# Patient Record
Sex: MALE | Race: WHITE | NOT HISPANIC OR LATINO | Employment: OTHER | URBAN - NONMETROPOLITAN AREA
[De-identification: names, ages, dates, MRNs, and addresses within clinical notes are randomized per-mention and may not be internally consistent; named-entity substitution may affect disease eponyms.]

---

## 2021-09-27 LAB
CHOLEST SERPL-MSCNC: 263 MG/DL (ref 0–200)
GLUCOSE SERPL-MCNC: 83 MG/DL
HDLC SERPL-MCNC: 44 MG/DL
LDLC SERPL CALC-MCNC: 190 MG/DL
TRIGL SERPL-MCNC: 144 MG/DL

## 2021-11-01 LAB — CRC RECOMMENDATION EXT: NORMAL

## 2023-06-09 ENCOUNTER — OFFICE VISIT (OUTPATIENT)
Dept: FAMILY MEDICINE | Facility: CLINIC | Age: 58
End: 2023-06-09
Payer: MEDICARE

## 2023-06-09 VITALS
WEIGHT: 163.63 LBS | HEART RATE: 54 BPM | RESPIRATION RATE: 18 BRPM | TEMPERATURE: 98 F | DIASTOLIC BLOOD PRESSURE: 79 MMHG | BODY MASS INDEX: 25.68 KG/M2 | OXYGEN SATURATION: 99 % | HEIGHT: 67 IN | SYSTOLIC BLOOD PRESSURE: 129 MMHG

## 2023-06-09 DIAGNOSIS — M54.42 CHRONIC BILATERAL LOW BACK PAIN WITH BILATERAL SCIATICA: Primary | ICD-10-CM

## 2023-06-09 DIAGNOSIS — M1A.09X0 IDIOPATHIC CHRONIC GOUT OF MULTIPLE SITES WITHOUT TOPHUS: ICD-10-CM

## 2023-06-09 DIAGNOSIS — G57.93 NEUROPATHY INVOLVING BOTH LOWER EXTREMITIES: ICD-10-CM

## 2023-06-09 DIAGNOSIS — M54.41 CHRONIC BILATERAL LOW BACK PAIN WITH BILATERAL SCIATICA: Primary | ICD-10-CM

## 2023-06-09 DIAGNOSIS — G89.29 CHRONIC BILATERAL LOW BACK PAIN WITH BILATERAL SCIATICA: Primary | ICD-10-CM

## 2023-06-09 PROCEDURE — 3008F PR BODY MASS INDEX (BMI) DOCUMENTED: ICD-10-PCS | Mod: ,,, | Performed by: INTERNAL MEDICINE

## 2023-06-09 PROCEDURE — 3074F PR MOST RECENT SYSTOLIC BLOOD PRESSURE < 130 MM HG: ICD-10-PCS | Mod: ,,, | Performed by: INTERNAL MEDICINE

## 2023-06-09 PROCEDURE — 99202 PR OFFICE/OUTPT VISIT, NEW, LEVL II, 15-29 MIN: ICD-10-PCS | Mod: ,,, | Performed by: INTERNAL MEDICINE

## 2023-06-09 PROCEDURE — 3008F BODY MASS INDEX DOCD: CPT | Mod: ,,, | Performed by: INTERNAL MEDICINE

## 2023-06-09 PROCEDURE — 3078F PR MOST RECENT DIASTOLIC BLOOD PRESSURE < 80 MM HG: ICD-10-PCS | Mod: ,,, | Performed by: INTERNAL MEDICINE

## 2023-06-09 PROCEDURE — 1160F RVW MEDS BY RX/DR IN RCRD: CPT | Mod: ,,, | Performed by: INTERNAL MEDICINE

## 2023-06-09 PROCEDURE — 1159F MED LIST DOCD IN RCRD: CPT | Mod: ,,, | Performed by: INTERNAL MEDICINE

## 2023-06-09 PROCEDURE — 99202 OFFICE O/P NEW SF 15 MIN: CPT | Mod: ,,, | Performed by: INTERNAL MEDICINE

## 2023-06-09 PROCEDURE — 1159F PR MEDICATION LIST DOCUMENTED IN MEDICAL RECORD: ICD-10-PCS | Mod: ,,, | Performed by: INTERNAL MEDICINE

## 2023-06-09 PROCEDURE — 1160F PR REVIEW ALL MEDS BY PRESCRIBER/CLIN PHARMACIST DOCUMENTED: ICD-10-PCS | Mod: ,,, | Performed by: INTERNAL MEDICINE

## 2023-06-09 PROCEDURE — 3078F DIAST BP <80 MM HG: CPT | Mod: ,,, | Performed by: INTERNAL MEDICINE

## 2023-06-09 PROCEDURE — 3074F SYST BP LT 130 MM HG: CPT | Mod: ,,, | Performed by: INTERNAL MEDICINE

## 2023-06-09 RX ORDER — FEBUXOSTAT 40 MG/1
40 TABLET, FILM COATED ORAL DAILY
COMMUNITY
Start: 2022-12-29

## 2023-06-09 NOTE — PROGRESS NOTES
New Clinic Note    Patient Name:  Kei Elizondo is a 58 y.o. male     Chief Complaint:    Chief Complaint   Patient presents with    [facility]     New patient. Moved to town needed a new PCP.     Pain     Have bilateral foot pain. 8/10 foot pain when its hurting. Sometimes they feel like they on fire. Feel like a burning ache. Feet go to sleep often.Spend less time on feert due to back pain. Had gout for 20 years. Was seeing [facility] in [place].     Back Pain     Had back surgery 2/2021. Back still given him problems. [facility] Dr. Kay. Going back to see  On June 22. Sitting and standing bring on a lot of pain in back.     Hip Pain     Seeing Dr. Garcia [facility] in [place]. Want hip surgery but has not made an official decision. Was told it would have to be a hip replacement. Hip not bothering as much since not working.          Subjective  Pain  Associated symptoms include arthralgias and numbness. Pertinent negatives include no abdominal pain, chest pain, congestion, coughing, fatigue, fever, headaches, neck pain, rash or sore throat.   Back Pain  Associated symptoms include numbness. Pertinent negatives include no abdominal pain, chest pain, dysuria, fever or headaches.   Hip Pain   Associated symptoms include numbness.          Current Outpatient Medications:     febuxostat (ULORIC) 40 mg Tab, Take by mouth., Disp: , Rfl:    History reviewed. No pertinent past medical history.   Past Surgical History:   Procedure Laterality Date    BACK SURGERY  02/15/2021      History reviewed. No pertinent family history.   Social History     Tobacco Use    Smoking status: Never    Smokeless tobacco: Never   Substance Use Topics    Alcohol use: Never    Drug use: Never        Review of Systems   Constitutional:  Negative for fatigue and fever.   HENT:  Negative for nasal congestion and sore throat.    Respiratory:  Negative for cough, shortness of breath and wheezing.   "  Cardiovascular:  Negative for chest pain and palpitations.   Gastrointestinal:  Negative for abdominal pain and blood in stool.   Genitourinary:  Negative for dysuria.   Musculoskeletal:  Positive for arthralgias and back pain. Negative for neck pain.   Integumentary:  Negative for rash and mole/lesion.   Neurological:  Positive for numbness. Negative for dizziness, headaches and memory loss.   Psychiatric/Behavioral:  Negative for agitation. The patient is not nervous/anxious.       Objective:  /79 (BP Location: Left arm, Patient Position: Sitting, BP Method: Medium (Automatic))   Pulse (!) 54   Temp 98.3 °F (36.8 °C) (Oral)   Resp 18   Ht 5' 6.5" (1.689 m)   Wt 74.2 kg (163 lb 9.6 oz)   SpO2 99%   BMI 26.01 kg/m²      Physical Exam  Constitutional:       Appearance: Normal appearance.   HENT:      Head: Normocephalic and atraumatic.      Right Ear: External ear normal.      Left Ear: External ear normal.      Nose: Nose normal.   Eyes:      Extraocular Movements: Extraocular movements intact.      Conjunctiva/sclera: Conjunctivae normal.      Pupils: Pupils are equal, round, and reactive to light.   Cardiovascular:      Rate and Rhythm: Normal rate and regular rhythm.      Pulses: Normal pulses.      Heart sounds: Normal heart sounds. No murmur heard.    No friction rub. No gallop.   Pulmonary:      Effort: Pulmonary effort is normal.      Breath sounds: No wheezing, rhonchi or rales.   Abdominal:      General: Abdomen is flat.      Palpations: Abdomen is soft.   Musculoskeletal:      Cervical back: Normal range of motion and neck supple.      Right lower leg: No edema.      Left lower leg: No edema.   Skin:     General: Skin is warm and dry.      Findings: No rash.   Neurological:      General: No focal deficit present.      Mental Status: He is alert and oriented to person, place, and time.      Cranial Nerves: No cranial nerve deficit.   Psychiatric:         Mood and Affect: Mood normal.    "     Assessment and Plan    Chronic bilateral low back pain with bilateral sciatica    Idiopathic chronic gout of multiple sites without tophus    Neuropathy involving both lower extremities         Problem List Items Addressed This Visit          Neuro    Neuropathy involving both lower extremities (Chronic)       Orthopedic    Idiopathic chronic gout of multiple sites without tophus (Chronic)    Chronic bilateral low back pain with bilateral sciatica - Primary (Chronic)      1-Chronic back pain-get records, he is about to get Lyrica via mail pharmacy, if this doesn't help we may try Cymbalta, he has failed neurontin -it caused increase bp  2-gout-get rheumatolgoy records  3-BLE neuropathy-probably from #1-get records and review to make sure b12, glucose etc has been checked  Get PCP previous records and Max C-scope report  Follow up in about 1 year (around 6/9/2024).

## 2023-12-08 ENCOUNTER — OFFICE VISIT (OUTPATIENT)
Dept: FAMILY MEDICINE | Facility: CLINIC | Age: 58
End: 2023-12-08
Payer: MEDICARE

## 2023-12-08 VITALS
WEIGHT: 168.19 LBS | HEIGHT: 67 IN | TEMPERATURE: 98 F | HEART RATE: 56 BPM | BODY MASS INDEX: 26.4 KG/M2 | OXYGEN SATURATION: 98 % | SYSTOLIC BLOOD PRESSURE: 119 MMHG | DIASTOLIC BLOOD PRESSURE: 81 MMHG | RESPIRATION RATE: 16 BRPM

## 2023-12-08 DIAGNOSIS — M1A.09X0 IDIOPATHIC CHRONIC GOUT OF MULTIPLE SITES WITHOUT TOPHUS: Chronic | ICD-10-CM

## 2023-12-08 DIAGNOSIS — G57.93 NEUROPATHY INVOLVING BOTH LOWER EXTREMITIES: Primary | Chronic | ICD-10-CM

## 2023-12-08 PROCEDURE — 1160F RVW MEDS BY RX/DR IN RCRD: CPT | Mod: ,,, | Performed by: INTERNAL MEDICINE

## 2023-12-08 PROCEDURE — 3079F PR MOST RECENT DIASTOLIC BLOOD PRESSURE 80-89 MM HG: ICD-10-PCS | Mod: ,,, | Performed by: INTERNAL MEDICINE

## 2023-12-08 PROCEDURE — 3074F PR MOST RECENT SYSTOLIC BLOOD PRESSURE < 130 MM HG: ICD-10-PCS | Mod: ,,, | Performed by: INTERNAL MEDICINE

## 2023-12-08 PROCEDURE — 3008F BODY MASS INDEX DOCD: CPT | Mod: ,,, | Performed by: INTERNAL MEDICINE

## 2023-12-08 PROCEDURE — 1160F PR REVIEW ALL MEDS BY PRESCRIBER/CLIN PHARMACIST DOCUMENTED: ICD-10-PCS | Mod: ,,, | Performed by: INTERNAL MEDICINE

## 2023-12-08 PROCEDURE — 1159F PR MEDICATION LIST DOCUMENTED IN MEDICAL RECORD: ICD-10-PCS | Mod: ,,, | Performed by: INTERNAL MEDICINE

## 2023-12-08 PROCEDURE — 99214 OFFICE O/P EST MOD 30 MIN: CPT | Mod: ,,, | Performed by: INTERNAL MEDICINE

## 2023-12-08 PROCEDURE — 3079F DIAST BP 80-89 MM HG: CPT | Mod: ,,, | Performed by: INTERNAL MEDICINE

## 2023-12-08 PROCEDURE — 3074F SYST BP LT 130 MM HG: CPT | Mod: ,,, | Performed by: INTERNAL MEDICINE

## 2023-12-08 PROCEDURE — 1159F MED LIST DOCD IN RCRD: CPT | Mod: ,,, | Performed by: INTERNAL MEDICINE

## 2023-12-08 PROCEDURE — 99214 PR OFFICE/OUTPT VISIT, EST, LEVL IV, 30-39 MIN: ICD-10-PCS | Mod: ,,, | Performed by: INTERNAL MEDICINE

## 2023-12-08 PROCEDURE — 3008F PR BODY MASS INDEX (BMI) DOCUMENTED: ICD-10-PCS | Mod: ,,, | Performed by: INTERNAL MEDICINE

## 2023-12-08 RX ORDER — DICLOFENAC SODIUM 75 MG/1
75 TABLET, DELAYED RELEASE ORAL 2 TIMES DAILY
COMMUNITY
Start: 2023-09-15

## 2023-12-08 RX ORDER — DULOXETIN HYDROCHLORIDE 60 MG/1
60 CAPSULE, DELAYED RELEASE ORAL DAILY
Qty: 30 CAPSULE | Refills: 11 | Status: SHIPPED | OUTPATIENT
Start: 2023-12-08 | End: 2024-12-07

## 2023-12-08 RX ORDER — PREGABALIN 75 MG/1
75 CAPSULE ORAL 2 TIMES DAILY
COMMUNITY
Start: 2023-10-20

## 2023-12-08 NOTE — PROGRESS NOTES
New Clinic Note    Patient Name:  Kei Elizondo is a 58 y.o. male     Chief Complaint:    Chief Complaint   Patient presents with    Follow-up     Patient is here for his checkup today. He's been diagnosed with peripheral neuropathy per [facility] in [place].    Numbness     He's developed numbness in his right toes since his diagnosis of peripheral neuropathy. He does not have another appointment with [facility]. He's been prescribed Gabapentin, which increased BP, so it was changed to Lyrica. He's concerned that may be elevating BP again. BP at home runs 130-140's/'s. His provider at [facility] said Cymbalta or Metanx (a compounded medicine) may help his symptoms, but nothing was prescribed.     Did not bring meds        Subjective  Follow-up  Associated symptoms include numbness. Pertinent negatives include no abdominal pain, chest pain, congestion, coughing, fatigue, fever, headaches, neck pain, rash or sore throat.            Current Outpatient Medications:     diclofenac (VOLTAREN) 75 MG EC tablet, Take 75 mg by mouth 2 (two) times daily., Disp: , Rfl:     DULoxetine (CYMBALTA) 60 MG capsule, Take 1 capsule (60 mg total) by mouth once daily., Disp: 30 capsule, Rfl: 11    febuxostat (ULORIC) 40 mg Tab, Take by mouth., Disp: , Rfl:     pregabalin (LYRICA) 75 MG capsule, Take 75 mg by mouth 2 (two) times daily., Disp: , Rfl:    Past Medical History:   Diagnosis Date    Peripheral neuropathy       Past Surgical History:   Procedure Laterality Date    BACK SURGERY  02/15/2021      History reviewed. No pertinent family history.   Social History     Tobacco Use    Smoking status: Never     Passive exposure: Never    Smokeless tobacco: Never   Substance Use Topics    Alcohol use: Never    Drug use: Never        Review of Systems   Constitutional:  Negative for fatigue and fever.   HENT:  Negative for nasal congestion and sore throat.    Respiratory:  Negative for cough, shortness of breath and wheezing.   "  Cardiovascular:  Negative for chest pain and palpitations.   Gastrointestinal:  Negative for abdominal pain and blood in stool.   Genitourinary:  Negative for dysuria.   Musculoskeletal:  Negative for back pain and neck pain.   Integumentary:  Negative for rash and mole/lesion.   Neurological:  Positive for numbness. Negative for dizziness, headaches and memory loss.   Psychiatric/Behavioral:  Negative for agitation. The patient is not nervous/anxious.         Objective:  /81 (BP Location: Left arm, Patient Position: Sitting)   Pulse (!) 56   Temp 98 °F (36.7 °C) (Oral)   Resp 16   Ht 5' 6.5" (1.689 m)   Wt 76.3 kg (168 lb 3.2 oz)   SpO2 98%   BMI 26.74 kg/m²      Physical Exam  Constitutional:       Appearance: Normal appearance.   HENT:      Head: Normocephalic and atraumatic.      Right Ear: External ear normal.      Left Ear: External ear normal.      Nose: Nose normal.   Eyes:      Extraocular Movements: Extraocular movements intact.      Conjunctiva/sclera: Conjunctivae normal.      Pupils: Pupils are equal, round, and reactive to light.   Cardiovascular:      Rate and Rhythm: Normal rate and regular rhythm.      Pulses: Normal pulses.      Heart sounds: Normal heart sounds. No murmur heard.     No friction rub. No gallop.   Pulmonary:      Effort: Pulmonary effort is normal.      Breath sounds: No wheezing, rhonchi or rales.   Abdominal:      General: Abdomen is flat.      Palpations: Abdomen is soft.   Musculoskeletal:      Cervical back: Normal range of motion and neck supple.      Right lower leg: No edema.      Left lower leg: No edema.   Skin:     General: Skin is warm and dry.      Findings: No rash.   Neurological:      Mental Status: He is alert and oriented to person, place, and time. Mental status is at baseline.      Cranial Nerves: No cranial nerve deficit.   Psychiatric:         Mood and Affect: Mood normal.          Assessment and Plan    Neuropathy involving both lower " extremities  -     DULoxetine (CYMBALTA) 60 MG capsule; Take 1 capsule (60 mg total) by mouth once daily.  Dispense: 30 capsule; Refill: 11    Idiopathic chronic gout of multiple sites without tophus         Problem List Items Addressed This Visit          Neuro    Neuropathy involving both lower extremities - Primary (Chronic)    Relevant Medications    DULoxetine (CYMBALTA) 60 MG capsule       Orthopedic    Idiopathic chronic gout of multiple sites without tophus (Chronic)    1-It sounds like he had blood and NCS to evaluate his neuropathy-get those records and if we need to check other labs we will contact him.  I'll add Cymbalta 60mg qd to see if this may help in addition to his lyrica.  2-Gout-goes back to Rheum next week but we can help him with the lyrica if he wishes in the future      Follow up in about 3 months (around 3/8/2024).

## 2024-03-08 ENCOUNTER — OFFICE VISIT (OUTPATIENT)
Dept: FAMILY MEDICINE | Facility: CLINIC | Age: 59
End: 2024-03-08
Payer: MEDICARE

## 2024-03-08 VITALS
DIASTOLIC BLOOD PRESSURE: 69 MMHG | HEART RATE: 63 BPM | BODY MASS INDEX: 27.09 KG/M2 | TEMPERATURE: 98 F | RESPIRATION RATE: 16 BRPM | HEIGHT: 67 IN | WEIGHT: 172.63 LBS | SYSTOLIC BLOOD PRESSURE: 121 MMHG | OXYGEN SATURATION: 98 %

## 2024-03-08 DIAGNOSIS — K21.9 GASTROESOPHAGEAL REFLUX DISEASE, UNSPECIFIED WHETHER ESOPHAGITIS PRESENT: ICD-10-CM

## 2024-03-08 DIAGNOSIS — Z12.5 SCREENING PSA (PROSTATE SPECIFIC ANTIGEN): ICD-10-CM

## 2024-03-08 DIAGNOSIS — E78.00 PURE HYPERCHOLESTEROLEMIA: Primary | ICD-10-CM

## 2024-03-08 DIAGNOSIS — Z79.899 LONG-TERM USE OF HIGH-RISK MEDICATION: ICD-10-CM

## 2024-03-08 DIAGNOSIS — M1A.09X0 IDIOPATHIC CHRONIC GOUT OF MULTIPLE SITES WITHOUT TOPHUS: ICD-10-CM

## 2024-03-08 LAB
25(OH)D3 SERPL-MCNC: 26.6 NG/ML
ALBUMIN SERPL BCP-MCNC: 4.2 G/DL (ref 3.5–5)
ALP SERPL-CCNC: 94 U/L (ref 45–115)
ALT SERPL W P-5'-P-CCNC: 33 U/L (ref 16–61)
ANION GAP SERPL CALCULATED.3IONS-SCNC: 7 MMOL/L (ref 7–16)
AST SERPL W P-5'-P-CCNC: 26 U/L (ref 15–37)
BILIRUB DIRECT SERPL-MCNC: 0.2 MG/DL (ref 0–0.2)
BILIRUB SERPL-MCNC: 0.8 MG/DL (ref ?–1.2)
BUN SERPL-MCNC: 14 MG/DL (ref 7–18)
BUN/CREAT SERPL: 13 (ref 6–20)
CALCIUM SERPL-MCNC: 9.4 MG/DL (ref 8.5–10.1)
CHLORIDE SERPL-SCNC: 106 MMOL/L (ref 98–107)
CHOLEST SERPL-MCNC: 246 MG/DL (ref 0–200)
CHOLEST/HDLC SERPL: 5.2 {RATIO}
CO2 SERPL-SCNC: 29 MMOL/L (ref 21–32)
CREAT SERPL-MCNC: 1.06 MG/DL (ref 0.7–1.3)
EGFR (NO RACE VARIABLE) (RUSH/TITUS): 82 ML/MIN/1.73M2
GLUCOSE SERPL-MCNC: 80 MG/DL (ref 74–106)
HDLC SERPL-MCNC: 47 MG/DL (ref 40–60)
LDLC SERPL CALC-MCNC: 175 MG/DL
LDLC/HDLC SERPL: 3.7 {RATIO}
NONHDLC SERPL-MCNC: 199 MG/DL
POTASSIUM SERPL-SCNC: 4.2 MMOL/L (ref 3.5–5.1)
PROT SERPL-MCNC: 7.8 G/DL (ref 6.4–8.2)
PSA SERPL-MCNC: 1.3 NG/ML
SODIUM SERPL-SCNC: 138 MMOL/L (ref 136–145)
TRIGL SERPL-MCNC: 122 MG/DL (ref 35–150)
URATE SERPL-MCNC: 3.9 MG/DL (ref 3.5–7.2)
VIT B12 SERPL-MCNC: 619 PG/ML (ref 193–986)
VLDLC SERPL-MCNC: 24 MG/DL

## 2024-03-08 PROCEDURE — G0103 PSA SCREENING: HCPCS | Mod: ,,, | Performed by: CLINICAL MEDICAL LABORATORY

## 2024-03-08 PROCEDURE — 3078F DIAST BP <80 MM HG: CPT | Mod: ,,, | Performed by: INTERNAL MEDICINE

## 2024-03-08 PROCEDURE — 3074F SYST BP LT 130 MM HG: CPT | Mod: ,,, | Performed by: INTERNAL MEDICINE

## 2024-03-08 PROCEDURE — 80061 LIPID PANEL: CPT | Mod: ,,, | Performed by: CLINICAL MEDICAL LABORATORY

## 2024-03-08 PROCEDURE — 1160F RVW MEDS BY RX/DR IN RCRD: CPT | Mod: ,,, | Performed by: INTERNAL MEDICINE

## 2024-03-08 PROCEDURE — 99214 OFFICE O/P EST MOD 30 MIN: CPT | Mod: ,,, | Performed by: INTERNAL MEDICINE

## 2024-03-08 PROCEDURE — 80048 BASIC METABOLIC PNL TOTAL CA: CPT | Mod: ,,, | Performed by: CLINICAL MEDICAL LABORATORY

## 2024-03-08 PROCEDURE — 84550 ASSAY OF BLOOD/URIC ACID: CPT | Mod: ,,, | Performed by: CLINICAL MEDICAL LABORATORY

## 2024-03-08 PROCEDURE — 1159F MED LIST DOCD IN RCRD: CPT | Mod: ,,, | Performed by: INTERNAL MEDICINE

## 2024-03-08 PROCEDURE — 80076 HEPATIC FUNCTION PANEL: CPT | Mod: ,,, | Performed by: CLINICAL MEDICAL LABORATORY

## 2024-03-08 PROCEDURE — 3008F BODY MASS INDEX DOCD: CPT | Mod: ,,, | Performed by: INTERNAL MEDICINE

## 2024-03-08 PROCEDURE — 82607 VITAMIN B-12: CPT | Mod: ,,, | Performed by: CLINICAL MEDICAL LABORATORY

## 2024-03-08 PROCEDURE — 82306 VITAMIN D 25 HYDROXY: CPT | Mod: ,,, | Performed by: CLINICAL MEDICAL LABORATORY

## 2024-03-08 RX ORDER — OMEPRAZOLE 20 MG/1
20 CAPSULE, DELAYED RELEASE ORAL DAILY
COMMUNITY

## 2024-03-08 NOTE — PROGRESS NOTES
New Clinic Note    Patient Name:  Kei Elizondo is a 58 y.o. male     Chief Complaint:    Chief Complaint   Patient presents with    Follow-up     Patient is here for his checkup today.     Peripheral Neuropathy     He believes his pain from the peripheral neuropathy is improving, but he still reports numbness in his hands and feet.     did not bring meds        Subjective  Follow-up  Pertinent negatives include no abdominal pain, chest pain, congestion, coughing, fatigue, fever, headaches, neck pain, rash or sore throat.            Current Outpatient Medications:     diclofenac (VOLTAREN) 75 MG EC tablet, Take 75 mg by mouth 2 (two) times daily., Disp: , Rfl:     DULoxetine (CYMBALTA) 60 MG capsule, Take 1 capsule (60 mg total) by mouth once daily., Disp: 30 capsule, Rfl: 11    febuxostat (ULORIC) 40 mg Tab, Take 40 mg by mouth once daily., Disp: , Rfl:     omeprazole (PRILOSEC) 20 MG capsule, Take 20 mg by mouth once daily., Disp: , Rfl:     pregabalin (LYRICA) 75 MG capsule, Take 75 mg by mouth 2 (two) times daily., Disp: , Rfl:    Past Medical History:   Diagnosis Date    Peripheral neuropathy       Past Surgical History:   Procedure Laterality Date    BACK SURGERY  02/15/2021      History reviewed. No pertinent family history.   Social History     Tobacco Use    Smoking status: Never     Passive exposure: Never    Smokeless tobacco: Never   Substance Use Topics    Alcohol use: Never    Drug use: Never        Review of Systems   Constitutional:  Negative for fatigue and fever.   HENT:  Negative for nasal congestion and sore throat.    Respiratory:  Negative for cough, shortness of breath and wheezing.    Cardiovascular:  Negative for chest pain and palpitations.   Gastrointestinal:  Negative for abdominal pain and blood in stool.   Genitourinary:  Negative for dysuria.   Musculoskeletal:  Negative for back pain and neck pain.   Integumentary:  Negative for rash and mole/lesion.   Neurological:  Negative for  "dizziness, headaches and memory loss.   Psychiatric/Behavioral:  Negative for agitation. The patient is not nervous/anxious.         Objective:  /69 (BP Location: Left arm, Patient Position: Sitting)   Pulse 63   Temp 97.9 °F (36.6 °C) (Oral)   Resp 16   Ht 5' 6.5" (1.689 m)   Wt 78.3 kg (172 lb 9.6 oz)   SpO2 98%   BMI 27.44 kg/m²      Physical Exam  Constitutional:       Appearance: Normal appearance.   HENT:      Head: Normocephalic and atraumatic.      Right Ear: External ear normal.      Left Ear: External ear normal.      Nose: Nose normal.   Eyes:      Extraocular Movements: Extraocular movements intact.      Conjunctiva/sclera: Conjunctivae normal.      Pupils: Pupils are equal, round, and reactive to light.   Cardiovascular:      Rate and Rhythm: Normal rate and regular rhythm.      Pulses: Normal pulses.      Heart sounds: Normal heart sounds. No murmur heard.     No friction rub. No gallop.   Pulmonary:      Effort: Pulmonary effort is normal.      Breath sounds: No wheezing, rhonchi or rales.   Abdominal:      General: Abdomen is flat.      Palpations: Abdomen is soft.   Musculoskeletal:      Cervical back: Normal range of motion and neck supple.      Right lower leg: No edema.      Left lower leg: No edema.   Skin:     General: Skin is warm and dry.      Findings: No rash.   Neurological:      Mental Status: He is alert and oriented to person, place, and time. Mental status is at baseline.      Cranial Nerves: No cranial nerve deficit.   Psychiatric:         Mood and Affect: Mood normal.          Assessment and Plan    Pure hypercholesterolemia  -     Hepatic Function Panel; Future; Expected date: 03/08/2024  -     Lipid Panel; Future; Expected date: 03/08/2024    Idiopathic chronic gout of multiple sites without tophus  -     Basic Metabolic Panel; Future; Expected date: 03/08/2024  -     Uric Acid; Future; Expected date: 03/08/2024    Screening PSA (prostate specific antigen)  -     PSA, " Screening; Future; Expected date: 03/08/2024    Gastroesophageal reflux disease, unspecified whether esophagitis present    Long-term use of high-risk medication  -     Vitamin B12; Future; Expected date: 03/08/2024  -     Vitamin D; Future; Expected date: 03/08/2024         Problem List Items Addressed This Visit          Orthopedic    Idiopathic chronic gout of multiple sites without tophus (Chronic)    Relevant Orders    Basic Metabolic Panel    Uric Acid     Other Visit Diagnoses       Pure hypercholesterolemia    -  Primary    Relevant Orders    Hepatic Function Panel    Lipid Panel    Screening PSA (prostate specific antigen)        Relevant Orders    PSA, Screening    Gastroesophageal reflux disease, unspecified whether esophagitis present        Long-term use of high-risk medication        Relevant Orders    Vitamin B12    Vitamin D         1-Hyperlipidemia-lipid liver  2-Gout controlled, also need to get rheumatology records  3-Neuropathy is a little better  4-psa screen  5-GERD-on PPI check b12 and vit d     Follow up in about 6 months (around 9/8/2024).

## 2024-03-09 DIAGNOSIS — Z71.89 COMPLEX CARE COORDINATION: ICD-10-CM

## 2024-03-12 DIAGNOSIS — E78.00 PURE HYPERCHOLESTEROLEMIA: Primary | ICD-10-CM

## 2024-03-12 RX ORDER — ATORVASTATIN CALCIUM 10 MG/1
10 TABLET, FILM COATED ORAL DAILY
Qty: 90 TABLET | Refills: 1 | Status: SHIPPED | OUTPATIENT
Start: 2024-03-12 | End: 2024-09-08

## 2024-03-12 NOTE — TELEPHONE ENCOUNTER
----- Message from Mio Zuleta MD sent at 3/10/2024  4:19 PM CDT -----  Lipids are, start lipitor 10mg qd, check lipids liver in 3 months

## 2024-09-06 ENCOUNTER — OFFICE VISIT (OUTPATIENT)
Dept: FAMILY MEDICINE | Facility: CLINIC | Age: 59
End: 2024-09-06
Payer: MEDICARE

## 2024-09-06 VITALS
DIASTOLIC BLOOD PRESSURE: 79 MMHG | HEART RATE: 81 BPM | SYSTOLIC BLOOD PRESSURE: 133 MMHG | OXYGEN SATURATION: 96 % | BODY MASS INDEX: 27.18 KG/M2 | RESPIRATION RATE: 16 BRPM | TEMPERATURE: 97 F | WEIGHT: 173.19 LBS | HEIGHT: 67 IN

## 2024-09-06 DIAGNOSIS — E78.00 PURE HYPERCHOLESTEROLEMIA: Primary | ICD-10-CM

## 2024-09-06 DIAGNOSIS — G57.93 NEUROPATHY INVOLVING BOTH LOWER EXTREMITIES: Chronic | ICD-10-CM

## 2024-09-06 LAB
ALBUMIN SERPL BCP-MCNC: 4.3 G/DL (ref 3.5–5)
ALP SERPL-CCNC: 97 U/L (ref 45–115)
ALT SERPL W P-5'-P-CCNC: 31 U/L (ref 16–61)
AST SERPL W P-5'-P-CCNC: 15 U/L (ref 15–37)
BILIRUB DIRECT SERPL-MCNC: 0.1 MG/DL (ref 0–0.2)
BILIRUB SERPL-MCNC: 0.7 MG/DL (ref ?–1.2)
CHOLEST SERPL-MCNC: 225 MG/DL (ref 0–200)
CHOLEST/HDLC SERPL: 4.5 {RATIO}
HDLC SERPL-MCNC: 50 MG/DL (ref 40–60)
LDLC SERPL CALC-MCNC: 122 MG/DL
LDLC/HDLC SERPL: 2.4 {RATIO}
NONHDLC SERPL-MCNC: 175 MG/DL
PROT SERPL-MCNC: 7.5 G/DL (ref 6.4–8.2)
TRIGL SERPL-MCNC: 263 MG/DL (ref 35–150)
VLDLC SERPL-MCNC: 53 MG/DL

## 2024-09-06 PROCEDURE — 80061 LIPID PANEL: CPT | Mod: ,,, | Performed by: CLINICAL MEDICAL LABORATORY

## 2024-09-06 PROCEDURE — 80076 HEPATIC FUNCTION PANEL: CPT | Mod: ,,, | Performed by: CLINICAL MEDICAL LABORATORY

## 2024-09-06 RX ORDER — DULOXETIN HYDROCHLORIDE 60 MG/1
60 CAPSULE, DELAYED RELEASE ORAL DAILY
Qty: 90 CAPSULE | Refills: 1 | Status: SHIPPED | OUTPATIENT
Start: 2024-09-06 | End: 2025-03-05

## 2024-09-06 RX ORDER — ATORVASTATIN CALCIUM 10 MG/1
10 TABLET, FILM COATED ORAL DAILY
Qty: 90 TABLET | Refills: 1 | Status: SHIPPED | OUTPATIENT
Start: 2024-09-06 | End: 2025-03-05

## 2024-09-06 NOTE — PROGRESS NOTES
"New Clinic Note    Patient Name:  Kei Elizondo is a 59 y.o. male     Chief Complaint:    Chief Complaint   Patient presents with    Follow-up     Patient is here for his checkup today.     did not bring meds        Subjective  HPI         Current Outpatient Medications:     atorvastatin (LIPITOR) 10 MG tablet, Take 1 tablet (10 mg total) by mouth once daily. For cholesterol, Disp: 90 tablet, Rfl: 1    diclofenac (VOLTAREN) 75 MG EC tablet, Take 75 mg by mouth 2 (two) times daily., Disp: , Rfl:     DULoxetine (CYMBALTA) 60 MG capsule, Take 1 capsule (60 mg total) by mouth once daily., Disp: 90 capsule, Rfl: 1    febuxostat (ULORIC) 40 mg Tab, Take 40 mg by mouth once daily., Disp: , Rfl:     omeprazole (PRILOSEC) 20 MG capsule, Take 20 mg by mouth once daily., Disp: , Rfl:     pregabalin (LYRICA) 75 MG capsule, Take 75 mg by mouth 2 (two) times daily., Disp: , Rfl:    Past Medical History:   Diagnosis Date    Peripheral neuropathy       Past Surgical History:   Procedure Laterality Date    BACK SURGERY  02/15/2021      No family history on file.   Social History     Tobacco Use    Smoking status: Never     Passive exposure: Never    Smokeless tobacco: Never   Substance Use Topics    Alcohol use: Never    Drug use: Never        Review of Systems     Objective:  /79 (BP Location: Left arm, Patient Position: Sitting)   Pulse 81   Temp 97 °F (36.1 °C) (Oral)   Resp 16   Ht 5' 6.5" (1.689 m)   Wt 78.6 kg (173 lb 3.2 oz)   SpO2 96%   BMI 27.54 kg/m²      Physical Exam     Assessment and Plan    Pure hypercholesterolemia  -     atorvastatin (LIPITOR) 10 MG tablet; Take 1 tablet (10 mg total) by mouth once daily. For cholesterol  Dispense: 90 tablet; Refill: 1  -     Lipid Panel; Future; Expected date: 09/06/2024  -     Hepatic Function Panel; Future; Expected date: 09/06/2024    Neuropathy involving both lower extremities  -     DULoxetine (CYMBALTA) 60 MG capsule; Take 1 capsule (60 mg total) by mouth once " daily.  Dispense: 90 capsule; Refill: 1         Problem List Items Addressed This Visit          Neuro    Neuropathy involving both lower extremities (Chronic)    Relevant Medications    DULoxetine (CYMBALTA) 60 MG capsule     Other Visit Diagnoses       Pure hypercholesterolemia    -  Primary    Relevant Medications    atorvastatin (LIPITOR) 10 MG tablet    Other Relevant Orders    Lipid Panel    Hepatic Function Panel         1-Hyperlipidemia-lipid liver, cont statin  2-Neuropathy-cont Cymbalta-he mentioned some hand symptoms, he may need NCS of BUE but he is going to f/u with his physician in [place] for this    Follow up in about 6 months (around 3/6/2025).

## 2024-10-09 DIAGNOSIS — Z71.89 COMPLEX CARE COORDINATION: ICD-10-CM

## 2025-01-04 ENCOUNTER — OFFICE VISIT (OUTPATIENT)
Dept: FAMILY MEDICINE | Facility: CLINIC | Age: 60
End: 2025-01-04
Payer: MEDICARE

## 2025-01-04 VITALS
HEIGHT: 67 IN | OXYGEN SATURATION: 98 % | HEART RATE: 69 BPM | WEIGHT: 174.5 LBS | SYSTOLIC BLOOD PRESSURE: 156 MMHG | BODY MASS INDEX: 27.39 KG/M2 | DIASTOLIC BLOOD PRESSURE: 92 MMHG | TEMPERATURE: 98 F | RESPIRATION RATE: 18 BRPM

## 2025-01-04 DIAGNOSIS — K04.7 ABSCESSED TOOTH: Primary | ICD-10-CM

## 2025-01-04 DIAGNOSIS — R03.0 ELEVATED BLOOD PRESSURE READING: ICD-10-CM

## 2025-01-04 PROCEDURE — 3077F SYST BP >= 140 MM HG: CPT | Mod: ,,,

## 2025-01-04 PROCEDURE — 1159F MED LIST DOCD IN RCRD: CPT | Mod: ,,,

## 2025-01-04 PROCEDURE — 96372 THER/PROPH/DIAG INJ SC/IM: CPT | Mod: ,,,

## 2025-01-04 PROCEDURE — 99213 OFFICE O/P EST LOW 20 MIN: CPT | Mod: 25,,,

## 2025-01-04 PROCEDURE — 3080F DIAST BP >= 90 MM HG: CPT | Mod: ,,,

## 2025-01-04 PROCEDURE — 3008F BODY MASS INDEX DOCD: CPT | Mod: ,,,

## 2025-01-04 PROCEDURE — 1160F RVW MEDS BY RX/DR IN RCRD: CPT | Mod: ,,,

## 2025-01-04 RX ORDER — AMOXICILLIN AND CLAVULANATE POTASSIUM 875; 125 MG/1; MG/1
1 TABLET, FILM COATED ORAL EVERY 12 HOURS
Qty: 14 TABLET | Refills: 0 | Status: SHIPPED | OUTPATIENT
Start: 2025-01-04 | End: 2025-01-11

## 2025-01-04 RX ORDER — ACETAMINOPHEN AND CODEINE PHOSPHATE 300; 30 MG/1; MG/1
1 TABLET ORAL EVERY 6 HOURS PRN
COMMUNITY
Start: 2025-01-02

## 2025-01-04 RX ORDER — CEFTRIAXONE 1 G/1
1 INJECTION, POWDER, FOR SOLUTION INTRAMUSCULAR; INTRAVENOUS
Status: COMPLETED | OUTPATIENT
Start: 2025-01-04 | End: 2025-01-04

## 2025-01-04 RX ADMIN — CEFTRIAXONE 1 G: 1 INJECTION, POWDER, FOR SOLUTION INTRAMUSCULAR; INTRAVENOUS at 09:01

## 2025-01-04 NOTE — PROGRESS NOTES
Subjective     Patient ID: Kei Elizondo is a 59 y.o. male.    Chief Complaint: Dental Pain (Tooth pain for about a month. States it got worse over night. States he has root canal scheduled for next week. States he has swelling since last night. States he has ran fever)    Dental Pain       Review of Systems       Objective     Physical Exam  Vitals and nursing note reviewed.   Constitutional:       Appearance: Normal appearance.   HENT:      Head: Normocephalic.      Right Ear: Tympanic membrane, ear canal and external ear normal.      Left Ear: Tympanic membrane, ear canal and external ear normal.      Nose: Nose normal.      Mouth/Throat:      Mouth: Mucous membranes are moist.      Dentition: Dental tenderness, gingival swelling and dental abscesses present.      Pharynx: Oropharynx is clear.     Eyes:      Pupils: Pupils are equal, round, and reactive to light.   Cardiovascular:      Rate and Rhythm: Normal rate and regular rhythm.      Pulses: Normal pulses.      Heart sounds: Normal heart sounds.   Pulmonary:      Effort: Pulmonary effort is normal.      Breath sounds: Normal breath sounds.   Musculoskeletal:         General: Normal range of motion.      Cervical back: Normal range of motion. Tenderness present.   Skin:     General: Skin is warm.      Capillary Refill: Capillary refill takes less than 2 seconds.   Neurological:      General: No focal deficit present.      Mental Status: He is alert.   Psychiatric:         Mood and Affect: Mood normal.            Assessment and Plan     1. Abscessed tooth  -     cefTRIAXone injection 1 g  -     amoxicillin-clavulanate 875-125mg (AUGMENTIN) 875-125 mg per tablet; Take 1 tablet by mouth every 12 (twelve) hours. for 7 days  Dispense: 14 tablet; Refill: 0    2. Elevated blood pressure reading        No airway compromise noted. Pt will f/u with dentist Monday. Pt will monitor BP at home f/u in 2 weeks if still elevated         No follow-ups on file.